# Patient Record
Sex: FEMALE | Race: WHITE | NOT HISPANIC OR LATINO | Employment: OTHER | URBAN - METROPOLITAN AREA
[De-identification: names, ages, dates, MRNs, and addresses within clinical notes are randomized per-mention and may not be internally consistent; named-entity substitution may affect disease eponyms.]

---

## 2024-07-30 ENCOUNTER — TELEPHONE (OUTPATIENT)
Dept: OBSTETRICS AND GYNECOLOGY | Facility: CLINIC | Age: 24
End: 2024-07-30
Payer: MEDICAID

## 2024-07-30 NOTE — TELEPHONE ENCOUNTER
----- Message from Barbara De Dios sent at 7/30/2024 10:14 AM CDT -----  Type: NOB Confirmation     Who Called: Cait Valencia    Symptoms : NOB   Missed cycle: yes   Home tested: Yes ( 3)   Would the patient rather a call back or a response via [facility]?    Best call back number: [phone]    Additional Info: need confirmation please call to schedule

## 2024-08-05 DIAGNOSIS — Z34.91 FIRST TRIMESTER PREGNANCY: Primary | ICD-10-CM
